# Patient Record
Sex: FEMALE | Race: WHITE | Employment: FULL TIME | URBAN - METROPOLITAN AREA
[De-identification: names, ages, dates, MRNs, and addresses within clinical notes are randomized per-mention and may not be internally consistent; named-entity substitution may affect disease eponyms.]

---

## 2021-05-06 ENCOUNTER — HOSPITAL ENCOUNTER (OUTPATIENT)
Dept: LAB | Age: 71
Setting detail: SPECIMEN
Discharge: HOME | End: 2021-05-06
Payer: MEDICARE

## 2021-05-06 DIAGNOSIS — Z01.818 PREOP TESTING: Primary | ICD-10-CM

## 2021-05-06 PROCEDURE — U0003 INFECTIOUS AGENT DETECTION BY NUCLEIC ACID (DNA OR RNA); SEVERE ACUTE RESPIRATORY SYNDROME CORONAVIRUS 2 (SARS-COV-2) (CORONAVIRUS DISEASE [COVID-19]), AMPLIFIED PROBE TECHNIQUE, MAKING USE OF HIGH THROUGHPUT TECHNOLOGIES AS DESCRIBED BY CMS-2020-01-R: HCPCS

## 2021-05-06 PROCEDURE — U0005 INFEC AGEN DETEC AMPLI PROBE: HCPCS

## 2021-05-07 LAB
SARS-COV-2: NORMAL
SARS-COV-2: NOT DETECTED
SOURCE: NORMAL

## 2021-05-07 RX ORDER — SODIUM CHLORIDE 9 MG/ML
INJECTION, SOLUTION INTRAVENOUS CONTINUOUS
Status: CANCELLED | OUTPATIENT
Start: 2021-05-10

## 2021-05-10 ENCOUNTER — HOSPITAL ENCOUNTER (OUTPATIENT)
Dept: CT IMAGING | Age: 71
Discharge: HOME | End: 2021-05-12
Payer: MEDICARE

## 2021-05-10 ENCOUNTER — HOSPITAL ENCOUNTER (OUTPATIENT)
Dept: GENERAL RADIOLOGY | Age: 71
Discharge: HOME | End: 2021-05-12
Payer: MEDICARE

## 2021-05-10 VITALS
RESPIRATION RATE: 18 BRPM | SYSTOLIC BLOOD PRESSURE: 148 MMHG | OXYGEN SATURATION: 100 % | BODY MASS INDEX: 27.97 KG/M2 | TEMPERATURE: 98.2 F | WEIGHT: 174 LBS | DIASTOLIC BLOOD PRESSURE: 71 MMHG | HEART RATE: 68 BPM | HEIGHT: 66 IN

## 2021-05-10 DIAGNOSIS — R91.8 LUNG MASS: ICD-10-CM

## 2021-05-10 LAB
INR BLD: 0.9
PARTIAL THROMBOPLASTIN TIME: 32.4 SEC (ref 23.9–33.8)
PLATELET # BLD: 248 K/UL (ref 138–453)
PROTHROMBIN TIME: 12.4 SEC (ref 11.5–14.2)

## 2021-05-10 PROCEDURE — 85610 PROTHROMBIN TIME: CPT

## 2021-05-10 PROCEDURE — 6360000002 HC RX W HCPCS: Performed by: RADIOLOGY

## 2021-05-10 PROCEDURE — 2580000003 HC RX 258: Performed by: RADIOLOGY

## 2021-05-10 PROCEDURE — 88341 IMHCHEM/IMCYTCHM EA ADD ANTB: CPT

## 2021-05-10 PROCEDURE — 71045 X-RAY EXAM CHEST 1 VIEW: CPT

## 2021-05-10 PROCEDURE — 88305 TISSUE EXAM BY PATHOLOGIST: CPT

## 2021-05-10 PROCEDURE — 7100000011 HC PHASE II RECOVERY - ADDTL 15 MIN

## 2021-05-10 PROCEDURE — 7100000010 HC PHASE II RECOVERY - FIRST 15 MIN

## 2021-05-10 PROCEDURE — 85049 AUTOMATED PLATELET COUNT: CPT

## 2021-05-10 PROCEDURE — 2709999900 CT NEEDLE BIOPSY LUNG PERCUTANEOUS W IMAGING GUIDANCE

## 2021-05-10 PROCEDURE — 85730 THROMBOPLASTIN TIME PARTIAL: CPT

## 2021-05-10 PROCEDURE — 88342 IMHCHEM/IMCYTCHM 1ST ANTB: CPT

## 2021-05-10 PROCEDURE — 88333 PATH CONSLTJ SURG CYTO XM 1: CPT

## 2021-05-10 PROCEDURE — 77012 CT SCAN FOR NEEDLE BIOPSY: CPT

## 2021-05-10 RX ORDER — ACETAMINOPHEN 325 MG/1
650 TABLET ORAL EVERY 4 HOURS PRN
Status: DISCONTINUED | OUTPATIENT
Start: 2021-05-10 | End: 2021-05-13 | Stop reason: HOSPADM

## 2021-05-10 RX ORDER — ASPIRIN 81 MG/1
81 TABLET ORAL DAILY
COMMUNITY

## 2021-05-10 RX ORDER — MIDAZOLAM HYDROCHLORIDE 1 MG/ML
INJECTION INTRAMUSCULAR; INTRAVENOUS
Status: COMPLETED | OUTPATIENT
Start: 2021-05-10 | End: 2021-05-10

## 2021-05-10 RX ORDER — SODIUM CHLORIDE 9 MG/ML
INJECTION, SOLUTION INTRAVENOUS CONTINUOUS
Status: DISCONTINUED | OUTPATIENT
Start: 2021-05-10 | End: 2021-05-13 | Stop reason: HOSPADM

## 2021-05-10 RX ORDER — FENTANYL CITRATE 50 UG/ML
INJECTION, SOLUTION INTRAMUSCULAR; INTRAVENOUS
Status: COMPLETED | OUTPATIENT
Start: 2021-05-10 | End: 2021-05-10

## 2021-05-10 RX ADMIN — MIDAZOLAM 1 MG: 1 INJECTION INTRAMUSCULAR; INTRAVENOUS at 11:23

## 2021-05-10 RX ADMIN — SODIUM CHLORIDE: 9 INJECTION, SOLUTION INTRAVENOUS at 09:29

## 2021-05-10 RX ADMIN — FENTANYL CITRATE 50 MCG: 50 INJECTION INTRAMUSCULAR; INTRAVENOUS at 11:23

## 2021-05-10 ASSESSMENT — PAIN SCALES - GENERAL
PAINLEVEL_OUTOF10: 0
PAINLEVEL_OUTOF10: 0

## 2021-05-10 ASSESSMENT — PAIN - FUNCTIONAL ASSESSMENT
PAIN_FUNCTIONAL_ASSESSMENT: 0-10

## 2021-05-10 NOTE — BRIEF OP NOTE
Brief Postoperative Note    Monique Foster  YOB: 1950  [id number]    Pre-operative Diagnosis: LLL lung nodule     Post-operative Diagnosis: Same    Procedure: L lung nodule bx    Anesthesia: Local and Moderate Sedation    Surgeons/Assistants: CHARLINE PADILLA    Estimated Blood Loss: 0    Complications: None    Specimens: Was Obtained: 20g core x 3    Findings: successful LLL lung nodule biopsy    Electronically signed by Best Mei MD on 5/10/2021 at 12:06 PM

## 2021-05-10 NOTE — POST SEDATION
Sedation Post Procedure Note    Patient Name: Keenan Espinal   YOB: 1950  Room/Bed: Room/bed info not found  Medical Record Number: [id number]  Date: 5/10/2021   Time: 12:05 PM         Physicians/Assistants: Ayla Richard MD    Procedure Performed:  L lung nodule bx    Post-Sedation Vital Signs:  Vitals:    05/10/21 1155   BP: (!) 151/85   Pulse: 66   Resp: 14   Temp:    SpO2: 97%      Vital signs were reviewed and were stable after the procedure (see flow sheet for vitals)            Post-Sedation Exam: pt remains stable           Complications: none    Electronically signed by Ayla Richard MD on 5/10/2021 at 12:05 PM

## 2021-05-10 NOTE — PRE SEDATION
Sedation Pre-Procedure Note    Patient Name: Berna Chambers   YOB: 1950  Room/Bed: Room/bed info not found  Medical Record Number: [id number]  Date: 5/10/2021   Time: 11:07 AM       Indication:  Lung nodule bx    Consent: I have discussed with the patient and/or the patient representative the indication, alternatives, and the possible risks and/or complications of the planned procedure and the anesthesia methods. The patient and/or patient representative appear to understand and agree to proceed. Vital Signs:   Vitals:    05/10/21 0849   BP: (!) 155/78   Pulse: 73   Resp: 16   Temp: 96.4 °F (35.8 °C)   SpO2: 100%       Past Medical History:   has a past medical history of Arthritis, Hyperlipidemia, and Vertigo. Past Surgical History:   has a past surgical history that includes ovarian cyst removal (Left) and Breast surgery (Left). Medications:   Scheduled Meds:   Continuous Infusions:    sodium chloride 125 mL/hr at 05/10/21 0929     PRN Meds:   Home Meds:   Prior to Admission medications    Medication Sig Start Date End Date Taking? Authorizing Provider   Multiple Vitamins-Minerals (AIRBORNE PO) Take 1 tablet by mouth daily   Yes Historical Provider, MD   Cholecalciferol (VITAMIN D3) 125 MCG (5000 UT) TABS Take 1 tablet by mouth daily   Yes Historical Provider, MD   aspirin 81 MG EC tablet Take 81 mg by mouth daily   Yes Historical Provider, MD     Coumadin Use Last 7 Days:  no  Antiplatelet drug therapy use last 7 days: yes - ASA (held)  Other anticoagulant use last 7 days: no     Pre-Sedation Documentation and Exam:   I have reviewed the patient's history and review of systems.     Mallampati Airway Assessment:  Mallampati Class II - (soft palate, fauces & uvula are visible)    Prior History of Anesthesia Complications:   none    ASA Classification:  Class 2 - A normal healthy patient with mild systemic disease    Sedation/ Anesthesia Plan:   intravenous sedation    Medications Planned: midazolam (Versed) intravenously and fentanyl intravenously    Patient is an appropriate candidate for plan of sedation: yes    Electronically signed by Ashley Choudhary MD on 5/10/2021 at 11:07 AM

## 2021-05-10 NOTE — H&P
Interval History and Physical    Pt Name: Higinio Matthews  MRN: [id number]  YOB: 1950  Date of evaluation: 5/10/2021      [x] I have reviewed the hardcopy Pulmonary Progress Note by Dr. Sally Hanna dated 4/27/2021 labeled in short chart which meets the criteria for an Interval History and Physical note. [x] I have examined  Higinio Matthews  There are no changes to the patient who is scheduled for a CT guided lung biopsy by Dr. Ricky Mcgregor for nonspecific abnormal finding of lung field. The patient denies new health changes, fever, chills, wheezing, cough, increased SOB, chest pain, open sores or wounds. Denies hx of diabetes. Last ASA 81mg 5/4/2021. Vital signs: BP (!) 155/78   Pulse 73   Temp 96.4 °F (35.8 °C) (Temporal)   Resp 16   Ht 5' 6\" (1.676 m)   Wt 174 lb (78.9 kg)   SpO2 100%   BMI 28.08 kg/m²     Allergies:  Patient has no known allergies. Past Medical History:     Past Medical History:   Diagnosis Date    Arthritis     right knee    Hyperlipidemia     boarderline- watching    Vertigo     occasional      Past Surgical History:     Past Surgical History:   Procedure Laterality Date    BREAST SURGERY Left     cyst removed    OVARIAN CYST REMOVAL Left         Social History:     Tobacco:    reports that she has been smoking. She started smoking about 56 years ago. She has been smoking about 0.50 packs per day. She has never used smokeless tobacco.  Alcohol:      reports previous alcohol use. Drug Use:  reports no history of drug use. Family History:     History reviewed. No pertinent family history. Medication History:     Prior to Admission medications    Medication Sig Start Date End Date Taking?  Authorizing Provider   Multiple Vitamins-Minerals (AIRBORNE PO) Take 1 tablet by mouth daily   Yes Historical Provider, MD   Cholecalciferol (VITAMIN D3) 125 MCG (5000 UT) TABS Take 1 tablet by mouth daily   Yes Historical Provider, MD   aspirin 81 MG EC tablet Take 81 mg by mouth daily   Yes Historical Provider, MD           This is a 66 y.o. female who is pleasant, cooperative, alert and oriented x3, in no acute distress. Heart: Heart sounds are normal.  HR 73 regular rate and rhythm without murmur, gallop or rub. Lungs: Normal respiratory effort with equal expansion, good air exchange, unlabored and clear to auscultation without wheezes or rales bilaterally   Abdomen: soft, nontender, nondistended with bowel sounds. Labs:  No results for input(s): HGB, HCT, WBC, MCV, PLT, NA, K, CL, CO2, BUN, CREATININE, GLUCOSE, INR, PROTIME, APTT, AST, ALT, LABALBU, HCG in the last 720 hours.     Recent Labs     05/06/21  0950   COVID19       Not Detected       Zhane Singh ANP-BC  Electronically signed 5/10/2021 at 9:12 AM

## 2021-05-11 LAB — SURGICAL PATHOLOGY REPORT: NORMAL

## 2021-05-20 ENCOUNTER — TELEPHONE (OUTPATIENT)
Dept: CASE MANAGEMENT | Age: 71
End: 2021-05-20

## 2021-05-20 NOTE — TELEPHONE ENCOUNTER
Lung Navigator reviewing chart and reaching out to Dr. Trenton Borjas staff and verify F/U . Pt. Following with Dr. Francine Figueroa. Navigation available if needed.